# Patient Record
Sex: FEMALE | Race: WHITE | NOT HISPANIC OR LATINO | Employment: UNEMPLOYED | URBAN - METROPOLITAN AREA
[De-identification: names, ages, dates, MRNs, and addresses within clinical notes are randomized per-mention and may not be internally consistent; named-entity substitution may affect disease eponyms.]

---

## 2020-07-19 ENCOUNTER — HOSPITAL ENCOUNTER (OUTPATIENT)
Facility: HOSPITAL | Age: 35
Setting detail: OBSERVATION
End: 2020-07-20
Attending: EMERGENCY MEDICINE | Admitting: HOSPITALIST

## 2020-07-19 DIAGNOSIS — R45.851 SUICIDAL IDEATIONS: Primary | ICD-10-CM

## 2020-07-19 PROBLEM — F19.10 SUBSTANCE ABUSE: Status: ACTIVE | Noted: 2020-07-19

## 2020-07-19 PROBLEM — F41.1 GENERALIZED ANXIETY DISORDER: Status: ACTIVE | Noted: 2020-07-19

## 2020-07-19 LAB
ALBUMIN SERPL-MCNC: 4.1 G/DL (ref 3.5–5.2)
ALBUMIN/GLOB SERPL: 1.4 G/DL
ALP SERPL-CCNC: 78 U/L (ref 39–117)
ALT SERPL W P-5'-P-CCNC: 23 U/L (ref 1–33)
AMPHET+METHAMPHET UR QL: POSITIVE
ANION GAP SERPL CALCULATED.3IONS-SCNC: 18 MMOL/L (ref 5–15)
APAP SERPL-MCNC: <5 MCG/ML (ref 10–30)
AST SERPL-CCNC: 36 U/L (ref 1–32)
B PARAPERT DNA SPEC QL NAA+PROBE: NOT DETECTED
B PERT DNA SPEC QL NAA+PROBE: NOT DETECTED
BARBITURATES UR QL SCN: NEGATIVE
BASOPHILS # BLD AUTO: 0.1 10*3/MM3 (ref 0–0.2)
BASOPHILS NFR BLD AUTO: 0.7 % (ref 0–1.5)
BENZODIAZ UR QL SCN: NEGATIVE
BILIRUB SERPL-MCNC: 1.9 MG/DL (ref 0–1.2)
BUN SERPL-MCNC: 10 MG/DL (ref 6–20)
BUN SERPL-MCNC: ABNORMAL MG/DL
BUN/CREAT SERPL: ABNORMAL
C PNEUM DNA NPH QL NAA+NON-PROBE: NOT DETECTED
CALCIUM SPEC-SCNC: 9.5 MG/DL (ref 8.6–10.5)
CANNABINOIDS SERPL QL: NEGATIVE
CHLORIDE SERPL-SCNC: 98 MMOL/L (ref 98–107)
CK SERPL-CCNC: 326 U/L (ref 20–180)
CO2 SERPL-SCNC: 22 MMOL/L (ref 22–29)
COCAINE UR QL: NEGATIVE
CREAT SERPL-MCNC: 0.88 MG/DL (ref 0.57–1)
DEPRECATED RDW RBC AUTO: 44.6 FL (ref 37–54)
EOSINOPHIL # BLD AUTO: 0.2 10*3/MM3 (ref 0–0.4)
EOSINOPHIL NFR BLD AUTO: 3.3 % (ref 0.3–6.2)
ERYTHROCYTE [DISTWIDTH] IN BLOOD BY AUTOMATED COUNT: 13.6 % (ref 12.3–15.4)
ETHANOL UR QL: <0.01 %
FLUAV H1 2009 PAND RNA NPH QL NAA+PROBE: NOT DETECTED
FLUAV H1 HA GENE NPH QL NAA+PROBE: NOT DETECTED
FLUAV H3 RNA NPH QL NAA+PROBE: NOT DETECTED
FLUAV SUBTYP SPEC NAA+PROBE: NOT DETECTED
FLUBV RNA ISLT QL NAA+PROBE: NOT DETECTED
GFR SERPL CREATININE-BSD FRML MDRD: 73 ML/MIN/1.73
GLOBULIN UR ELPH-MCNC: 3 GM/DL
GLUCOSE SERPL-MCNC: 94 MG/DL (ref 65–99)
HADV DNA SPEC NAA+PROBE: NOT DETECTED
HCOV 229E RNA SPEC QL NAA+PROBE: NOT DETECTED
HCOV HKU1 RNA SPEC QL NAA+PROBE: NOT DETECTED
HCOV NL63 RNA SPEC QL NAA+PROBE: NOT DETECTED
HCOV OC43 RNA SPEC QL NAA+PROBE: NOT DETECTED
HCT VFR BLD AUTO: 41.4 % (ref 34–46.6)
HGB BLD-MCNC: 14.3 G/DL (ref 12–15.9)
HMPV RNA NPH QL NAA+NON-PROBE: NOT DETECTED
HPIV1 RNA SPEC QL NAA+PROBE: NOT DETECTED
HPIV2 RNA SPEC QL NAA+PROBE: NOT DETECTED
HPIV3 RNA NPH QL NAA+PROBE: NOT DETECTED
HPIV4 P GENE NPH QL NAA+PROBE: NOT DETECTED
LYMPHOCYTES # BLD AUTO: 2.2 10*3/MM3 (ref 0.7–3.1)
LYMPHOCYTES NFR BLD AUTO: 29.9 % (ref 19.6–45.3)
M PNEUMO IGG SER IA-ACNC: NOT DETECTED
MCH RBC QN AUTO: 32 PG (ref 26.6–33)
MCHC RBC AUTO-ENTMCNC: 34.5 G/DL (ref 31.5–35.7)
MCV RBC AUTO: 92.7 FL (ref 79–97)
METHADONE UR QL SCN: NEGATIVE
MONOCYTES # BLD AUTO: 0.8 10*3/MM3 (ref 0.1–0.9)
MONOCYTES NFR BLD AUTO: 10.4 % (ref 5–12)
NEUTROPHILS NFR BLD AUTO: 4.1 10*3/MM3 (ref 1.7–7)
NEUTROPHILS NFR BLD AUTO: 55.7 % (ref 42.7–76)
NRBC BLD AUTO-RTO: 0.1 /100 WBC (ref 0–0.2)
OPIATES UR QL: NEGATIVE
OXYCODONE UR QL SCN: NEGATIVE
PLATELET # BLD AUTO: 193 10*3/MM3 (ref 140–450)
PMV BLD AUTO: 6.9 FL (ref 6–12)
POTASSIUM SERPL-SCNC: 3.8 MMOL/L (ref 3.5–5.2)
PROT SERPL-MCNC: 7.1 G/DL (ref 6–8.5)
RBC # BLD AUTO: 4.47 10*6/MM3 (ref 3.77–5.28)
RHINOVIRUS RNA SPEC NAA+PROBE: NOT DETECTED
RSV RNA NPH QL NAA+NON-PROBE: NOT DETECTED
SALICYLATES SERPL-MCNC: 0.6 MG/DL
SARS-COV-2 RNA NPH QL NAA+NON-PROBE: NOT DETECTED
SODIUM SERPL-SCNC: 138 MMOL/L (ref 136–145)
WBC # BLD AUTO: 7.3 10*3/MM3 (ref 3.4–10.8)

## 2020-07-19 PROCEDURE — 80307 DRUG TEST PRSMV CHEM ANLYZR: CPT | Performed by: EMERGENCY MEDICINE

## 2020-07-19 PROCEDURE — G0378 HOSPITAL OBSERVATION PER HR: HCPCS

## 2020-07-19 PROCEDURE — 82550 ASSAY OF CK (CPK): CPT | Performed by: EMERGENCY MEDICINE

## 2020-07-19 PROCEDURE — 85025 COMPLETE CBC W/AUTO DIFF WBC: CPT | Performed by: EMERGENCY MEDICINE

## 2020-07-19 PROCEDURE — 99219 PR INITIAL OBSERVATION CARE/DAY 50 MINUTES: CPT | Performed by: INTERNAL MEDICINE

## 2020-07-19 PROCEDURE — 36415 COLL VENOUS BLD VENIPUNCTURE: CPT

## 2020-07-19 PROCEDURE — 0202U NFCT DS 22 TRGT SARS-COV-2: CPT | Performed by: EMERGENCY MEDICINE

## 2020-07-19 PROCEDURE — 99284 EMERGENCY DEPT VISIT MOD MDM: CPT

## 2020-07-19 PROCEDURE — 80053 COMPREHEN METABOLIC PANEL: CPT | Performed by: EMERGENCY MEDICINE

## 2020-07-19 RX ORDER — ALUMINA, MAGNESIA, AND SIMETHICONE 2400; 2400; 240 MG/30ML; MG/30ML; MG/30ML
15 SUSPENSION ORAL EVERY 6 HOURS PRN
Status: DISCONTINUED | OUTPATIENT
Start: 2020-07-19 | End: 2020-07-20 | Stop reason: HOSPADM

## 2020-07-19 RX ORDER — ACETAMINOPHEN 160 MG/5ML
650 SOLUTION ORAL EVERY 4 HOURS PRN
Status: DISCONTINUED | OUTPATIENT
Start: 2020-07-19 | End: 2020-07-20 | Stop reason: HOSPADM

## 2020-07-19 RX ORDER — ACETAMINOPHEN 650 MG/1
650 SUPPOSITORY RECTAL EVERY 4 HOURS PRN
Status: DISCONTINUED | OUTPATIENT
Start: 2020-07-19 | End: 2020-07-20 | Stop reason: HOSPADM

## 2020-07-19 RX ORDER — BISACODYL 10 MG
10 SUPPOSITORY, RECTAL RECTAL DAILY PRN
Status: DISCONTINUED | OUTPATIENT
Start: 2020-07-19 | End: 2020-07-20 | Stop reason: HOSPADM

## 2020-07-19 RX ORDER — SODIUM CHLORIDE 0.9 % (FLUSH) 0.9 %
10 SYRINGE (ML) INJECTION AS NEEDED
Status: DISCONTINUED | OUTPATIENT
Start: 2020-07-19 | End: 2020-07-20 | Stop reason: HOSPADM

## 2020-07-19 RX ORDER — SODIUM CHLORIDE 0.9 % (FLUSH) 0.9 %
10 SYRINGE (ML) INJECTION EVERY 12 HOURS SCHEDULED
Status: DISCONTINUED | OUTPATIENT
Start: 2020-07-19 | End: 2020-07-20 | Stop reason: HOSPADM

## 2020-07-19 RX ORDER — CHOLECALCIFEROL (VITAMIN D3) 125 MCG
5 CAPSULE ORAL NIGHTLY PRN
Status: DISCONTINUED | OUTPATIENT
Start: 2020-07-19 | End: 2020-07-20 | Stop reason: HOSPADM

## 2020-07-19 RX ORDER — BISACODYL 5 MG/1
5 TABLET, DELAYED RELEASE ORAL DAILY PRN
Status: DISCONTINUED | OUTPATIENT
Start: 2020-07-19 | End: 2020-07-20 | Stop reason: HOSPADM

## 2020-07-19 RX ORDER — ACETAMINOPHEN 500 MG
1000 TABLET ORAL ONCE
Status: COMPLETED | OUTPATIENT
Start: 2020-07-19 | End: 2020-07-19

## 2020-07-19 RX ORDER — GABAPENTIN 300 MG/1
300 CAPSULE ORAL 3 TIMES DAILY
Status: DISCONTINUED | OUTPATIENT
Start: 2020-07-19 | End: 2020-07-20 | Stop reason: HOSPADM

## 2020-07-19 RX ORDER — ACETAMINOPHEN 325 MG/1
650 TABLET ORAL EVERY 4 HOURS PRN
Status: DISCONTINUED | OUTPATIENT
Start: 2020-07-19 | End: 2020-07-20 | Stop reason: HOSPADM

## 2020-07-19 RX ORDER — BUPROPION HYDROCHLORIDE 150 MG/1
300 TABLET ORAL DAILY
Status: DISCONTINUED | OUTPATIENT
Start: 2020-07-19 | End: 2020-07-20 | Stop reason: HOSPADM

## 2020-07-19 RX ORDER — ONDANSETRON 4 MG/1
4 TABLET, FILM COATED ORAL EVERY 6 HOURS PRN
Status: DISCONTINUED | OUTPATIENT
Start: 2020-07-19 | End: 2020-07-20 | Stop reason: HOSPADM

## 2020-07-19 RX ORDER — NICOTINE 21 MG/24HR
1 PATCH, TRANSDERMAL 24 HOURS TRANSDERMAL
Status: DISCONTINUED | OUTPATIENT
Start: 2020-07-19 | End: 2020-07-20 | Stop reason: HOSPADM

## 2020-07-19 RX ORDER — HYDROXYZINE HYDROCHLORIDE 25 MG/1
25 TABLET, FILM COATED ORAL EVERY 6 HOURS PRN
Status: DISCONTINUED | OUTPATIENT
Start: 2020-07-19 | End: 2020-07-20 | Stop reason: HOSPADM

## 2020-07-19 RX ADMIN — GABAPENTIN 300 MG: 300 CAPSULE ORAL at 21:51

## 2020-07-19 RX ADMIN — NICOTINE 1 PATCH: 14 PATCH TRANSDERMAL at 15:16

## 2020-07-19 RX ADMIN — HYDROXYZINE HYDROCHLORIDE 25 MG: 25 TABLET, FILM COATED ORAL at 21:50

## 2020-07-19 RX ADMIN — MELATONIN TAB 5 MG 5 MG: 5 TAB at 21:50

## 2020-07-19 RX ADMIN — GABAPENTIN 300 MG: 300 CAPSULE ORAL at 15:16

## 2020-07-19 RX ADMIN — BUPROPION HYDROCHLORIDE 300 MG: 150 TABLET, EXTENDED RELEASE ORAL at 15:16

## 2020-07-19 RX ADMIN — ACETAMINOPHEN 1000 MG: 500 TABLET, FILM COATED ORAL at 09:08

## 2020-07-19 NOTE — H&P
[facility] HOSPITALIST     Provider, No Known    CHIEF COMPLAINT:     Suicidal ideation    HISTORY OF PRESENT ILLNESS:    35-year-old female past medical history of depression and substance abuse came to the ER complaining of suicidal ideation.  She had recently been in a rehab facility and they switched her antidepressants and that has not been working for her.  She recently relapsed with some methamphetamine abuse.  She is also been homeless for the past 4 days reported to the ER that she did want to hurt herself but has no current plans.  In the ER she did have evaluation by inpatient psych unit however they have no beds available but will have beds available tomorrow.  She was subsequently admitted to medicine with plans to discharge the patient there tomorrow.  Currently she is slightly anxious but cooperative and requesting to be started back on her previous antidepressants and antianxiety medications.  She has some cramping in her lower extremities.      History reviewed. No pertinent past medical history.  History reviewed. No pertinent surgical history.  History reviewed. No pertinent family history.  Social History     Tobacco Use   • Smoking status: Current Every Day Smoker     Packs/day: 0.50     Types: Cigarettes   • Smokeless tobacco: Never Used   Substance Use Topics   • Alcohol use: Not Currently   • Drug use: Yes     Frequency: 2.0 times per week     Types: IV, Methamphetamines     Comment: heroin, meth     No medications prior to admission.     Allergies:  Patient has no known allergies.      There is no immunization history on file for this patient.        REVIEW OF SYSTEMS:  Please see the above history of present illness for pertinent positives and negatives.  The remainder of the patient's systems have been reviewed and are negative.     Objective     Vital Signs  Temp:  [98.9 °F (37.2 °C)-99.7 °F (37.6 °C)] 98.9 °F (37.2 °C)  Heart Rate:  [] 76  Resp:  [16-24] 20  BP:  "()/() 133/116    Flowsheet Rows      First Filed Value   Admission Height  160 cm (63\") Documented at 07/19/2020 0810   Admission Weight  63.9 kg (140 lb 14 oz) Documented at 07/19/2020 0810           Physical Exam:  Physical Exam   Constitutional: She is oriented to person, place, and time. No distress.   HENT:   Head: Normocephalic.   Eyes: Pupils are equal, round, and reactive to light.   Neck: No JVD present.   Cardiovascular: Normal rate and regular rhythm.   Pulmonary/Chest: Effort normal. No respiratory distress.   Abdominal: Soft. She exhibits no distension.   Musculoskeletal: She exhibits no tenderness.   Neurological: She is alert and oriented to person, place, and time.   Skin: Skin is warm and dry.   Psychiatric: Thought content normal.   Slightly anxious         Results Review:    I reviewed the patient's new clinical results.  Lab Results (most recent)     Procedure Component Value Units Date/Time    Respiratory Panel PCR w/COVID-19(SARS-CoV-2) PERRY In-House, NP swab in [facility] [place] - Swab, Nasopharynx [[id number]] Collected:  07/19/20 1214    Specimen:  Swab from Nasopharynx Updated:  07/19/20 1215    Urine Drug Screen - Urine, Clean Catch [[id number]]  (Abnormal) Collected:  07/19/20 0850    Specimen:  Urine, Clean Catch Updated:  07/19/20 0923     Amphet/Methamphet, Screen Positive     Barbiturates Screen, Urine Negative     Benzodiazepine Screen, Urine Negative     Cocaine Screen, Urine Negative     Opiate Screen Negative     THC, Screen, Urine Negative     Methadone Screen, Urine Negative     Oxycodone Screen, Urine Negative    Narrative:       Negative Thresholds For Drugs Screened:     Amphetamines               500 ng/ml   Barbiturates               200 ng/ml   Benzodiazepines            100 ng/ml   Cocaine                    300 ng/ml   Methadone                  300 ng/ml   Opiates                    300 ng/ml   Oxycodone                  100 ng/ml   THC                        50 " ng/ml    The Normal Value for all drugs tested is negative. This report includes final unconfirmed screening results to be used for medical treatment purposes only. Unconfirmed results must not be used for non-medical purposes such as employment or legal testing. Clinical consideration should be applied to any drug of abuse test, particulary when unconfirmed results are used.  All urine drugs of abuse requests without chain of custody are for medical screening purposes only.  False positives are possible.      BUN [[id number]]  (Normal) Collected:  07/19/20 0839    Specimen:  Blood from Arm, Left Updated:  07/19/20 0920     BUN 10 mg/dL     Comprehensive Metabolic Panel [[id number]]  (Abnormal) Collected:  07/19/20 0839    Specimen:  Blood from Arm, Left Updated:  07/19/20 0917     Glucose 94 mg/dL      BUN --     Comment: Testing performed by alternate method        Creatinine 0.88 mg/dL      Sodium 138 mmol/L      Potassium 3.8 mmol/L      Comment: Specimen hemolyzed.  Results may be affected.        Chloride 98 mmol/L      CO2 22.0 mmol/L      Calcium 9.5 mg/dL      Total Protein 7.1 g/dL      Albumin 4.10 g/dL      ALT (SGPT) 23 U/L      AST (SGOT) 36 U/L      Alkaline Phosphatase 78 U/L      Total Bilirubin 1.9 mg/dL      eGFR Non African Amer 73 mL/min/1.73      Globulin 3.0 gm/dL      A/G Ratio 1.4 g/dL      BUN/Creatinine Ratio --     Comment: Testing not performed        Anion Gap 18.0 mmol/L     Narrative:       GFR Normal >60  Chronic Kidney Disease <60  Kidney Failure <15      CK [[id number]]  (Abnormal) Collected:  07/19/20 0839    Specimen:  Blood from Arm, Left Updated:  07/19/20 0917     Creatine Kinase 326 U/L     Acetaminophen Level [[id number]]  (Abnormal) Collected:  07/19/20 0839    Specimen:  Blood from Arm, Left Updated:  07/19/20 0917     Acetaminophen <5.0 mcg/mL     Narrative:       Acetaminophen Therapeutic Range  5-20 ug/mL      Hours after ingestion            Toxic Value    4 Hours                            150 ug/mL    8 Hours                            70 ug/mL   12 Hours                            40 ug/mL   16 Hours                            20 ug/mL    These values apply to a single ingestion only.     Salicylate Level [[id number]]  (Normal) Collected:  07/19/20 0839    Specimen:  Blood from Arm, Left Updated:  07/19/20 0917     Salicylate 0.6 mg/dL     Ethanol [[id number]] Collected:  07/19/20 0839    Specimen:  Blood from Arm, Left Updated:  07/19/20 0917     Ethanol % <0.010 %     Narrative:       Plasma Ethanol Clinical Symptoms:    ETOH (%)               Clinical Symptom  .01-.05              No apparent influence  .03-.12              Euphoria, Diminished judgment and attention   .09-.25              Impaired comprehension, Muscle incoordination  .18-.30              Confusion, Staggered gait, Slurred speech  .25-.40              Markedly decreased response to stimuli, unable to stand or                        walk, vomitting, sleep or stupor  .35-.50              Comatose, Anesthesia, Subnormal body temperature        CBC & Differential [[id number]] Collected:  07/19/20 0839    Specimen:  Blood from Arm, Left Updated:  07/19/20 0853    Narrative:       The following orders were created for panel order CBC & Differential.  Procedure                               Abnormality         Status                     ---------                               -----------         ------                     CBC Auto Differential[[id number]]        Normal              Final result                 Please view results for these tests on the individual orders.    CBC Auto Differential [[id number]]  (Normal) Collected:  07/19/20 0839    Specimen:  Blood from Arm, Left Updated:  07/19/20 0853     WBC 7.30 10*3/mm3      RBC 4.47 10*6/mm3      Hemoglobin 14.3 g/dL      Hematocrit 41.4 %      MCV 92.7 fL      MCH 32.0 pg      MCHC 34.5 g/dL      RDW 13.6 %      RDW-SD 44.6 fl      MPV 6.9 fL      Platelets 193  10*3/mm3      Neutrophil % 55.7 %      Lymphocyte % 29.9 %      Monocyte % 10.4 %      Eosinophil % 3.3 %      Basophil % 0.7 %      Neutrophils, Absolute 4.10 10*3/mm3      Lymphocytes, Absolute 2.20 10*3/mm3      Monocytes, Absolute 0.80 10*3/mm3      Eosinophils, Absolute 0.20 10*3/mm3      Basophils, Absolute 0.10 10*3/mm3      nRBC 0.1 /100 WBC           Imaging Results (Most Recent)     None            ECG/EMG Results (most recent)     None              Assessment/Plan     Active Hospital Problems:  Active Hospital Problems    Diagnosis  POA   • **Suicidal ideations [R45.851]  Not Applicable   • Generalized anxiety disorder [F41.1]  Yes   • Substance abuse (CMS/HCC) [F19.10]  Yes      Resolved Hospital Problems   No resolved problems to display.       Suicidal ideations  -Plan for transfer to inpatient psychiatric unit at [facility] tomorrow, evaluation done already and patient agreeable   -24-hour hold until then  -Suicide precautions    Generalized anxiety disorder  -Restart BuSpar  -PRN hydroxyzine    Chronic pain  -Restart gabapentin 3 times a day    DVT ppx-SCDs    This patient has been examined wearing appropriate Personal Protective Equipment . 07/19/20      I discussed the patient's findings and my recommendations with patient.     Tony Mercer DO  07/19/20  13:01

## 2020-07-19 NOTE — ED NOTES
I called [facility] for assessment on this patient.  They told me to call back when we have an ethanol level and hey will start process of mobile assessment.     Kristan Horton, RegSched Rep  07/19/20 0806

## 2020-07-19 NOTE — PLAN OF CARE
Patient her for SI. [facility] psych has accepted, will not have a bed until tomorrow at the earliest. Patient has sitter at bedside. She is pleasant and cooperative with staff.

## 2020-07-19 NOTE — ED NOTES
Face sheet and labs faxed to [facility].  I called Juli and reported a negative ethanol level.  She will begin arranging the mobile assessment with the clinician.     Kristan Horton, RegKenricked Rep  07/19/20 6181

## 2020-07-19 NOTE — PROGRESS NOTES
Continued Stay Note  BRIAN Dudley     Patient Name: Diamond Llamas  MRN: [id number]  Today's Date: 7/19/2020    Admit Date: 7/19/2020    Discharge Plan     Row Name 07/19/20 1329       Plan    Plan  [facility] bed Monday for SI    Plan Comments  Juli at [facility] stated a bed is available Monday. Pt will need a negative Covid19 test result and Consent to Treat faxed to [phone].  Juli stated she will fax the Consent to Treat form to Darcy at [phone].  Prabha is aware. Pt is agreeable. Pt was put on a 24 hr hold at 12:40pm 7/19 which expires 7/20 12:40pm.        Margaret PIERSONW, LSW  Weekend   Care Management Dept  Cell [phone]  Weekday Department [phone]

## 2020-07-19 NOTE — ED PROVIDER NOTES
Subjective   Chief complaint suicidal ideations    History of present illness 35-year-old female with a history of depression history of substance abuse who states that she is currently suicidal.  The patient states that she had been recently in  rehab facility and switched from BuSpar to Celexa.  She also states that she has been off Suboxone for 3 weeks and relapsed on some meth recently.  She states she has been homeless for the last 4 days and she wants to kill herself although she has no plan currently.  No alcohol use no injury.  Symptoms are severe continuous for days.          Review of Systems   Constitutional: Negative for chills and fever.   Respiratory: Negative for chest tightness and shortness of breath.    Gastrointestinal: Negative for abdominal pain and vomiting.   Genitourinary: Negative for difficulty urinating and dysuria.   Skin: Negative for color change and pallor.   Psychiatric/Behavioral: Positive for suicidal ideas. Negative for agitation.       History reviewed. No pertinent past medical history.    No Known Allergies    History reviewed. No pertinent surgical history.    History reviewed. No pertinent family history.    Social History     Socioeconomic History   • Marital status: Single     Spouse name: Not on file   • Number of children: Not on file   • Years of education: Not on file   • Highest education level: Not on file   Tobacco Use   • Smoking status: Current Every Day Smoker     Packs/day: 0.50     Types: Cigarettes   • Smokeless tobacco: Never Used   Substance and Sexual Activity   • Alcohol use: Not Currently   • Drug use: Yes     Frequency: 2.0 times per week     Types: IV, Methamphetamines     Comment: heroin, meth   • Sexual activity: Defer           Objective   Physical Exam  35-year-old awake alert very tearful crying HEENT extraocular muscles intact pupils equal and reactive neck supple no adenopathy no meningeal signs lungs clear no retractions heart regular without  murmur abdomen soft no tenderness or mass extremities full range of motion no deformities no palp cords or Homans sign no cellulitic changes.  Patient's awake alert very tearful crying no facial asymmetry normal speech without focal weakness  Procedures           ED Course      Results for orders placed or performed during the hospital encounter of 07/19/20   Comprehensive Metabolic Panel   Result Value Ref Range    Glucose 94 65 - 99 mg/dL    BUN      Creatinine 0.88 0.57 - 1.00 mg/dL    Sodium 138 136 - 145 mmol/L    Potassium 3.8 3.5 - 5.2 mmol/L    Chloride 98 98 - 107 mmol/L    CO2 22.0 22.0 - 29.0 mmol/L    Calcium 9.5 8.6 - 10.5 mg/dL    Total Protein 7.1 6.0 - 8.5 g/dL    Albumin 4.10 3.50 - 5.20 g/dL    ALT (SGPT) 23 1 - 33 U/L    AST (SGOT) 36 (H) 1 - 32 U/L    Alkaline Phosphatase 78 39 - 117 U/L    Total Bilirubin 1.9 (H) 0.0 - 1.2 mg/dL    eGFR Non African Amer 73 >60 mL/min/1.73    Globulin 3.0 gm/dL    A/G Ratio 1.4 g/dL    BUN/Creatinine Ratio      Anion Gap 18.0 (H) 5.0 - 15.0 mmol/L   CK   Result Value Ref Range    Creatine Kinase 326 (H) 20 - 180 U/L   Acetaminophen Level   Result Value Ref Range    Acetaminophen <5.0 (L) 10.0 - 30.0 mcg/mL   Salicylate Level   Result Value Ref Range    Salicylate 0.6 <=30.0 mg/dL   Ethanol   Result Value Ref Range    Ethanol % <0.010 %   Urine Drug Screen - Urine, Clean Catch   Result Value Ref Range    Amphet/Methamphet, Screen Positive (A) Negative    Barbiturates Screen, Urine Negative Negative    Benzodiazepine Screen, Urine Negative Negative    Cocaine Screen, Urine Negative Negative    Opiate Screen Negative Negative    THC, Screen, Urine Negative Negative    Methadone Screen, Urine Negative Negative    Oxycodone Screen, Urine Negative Negative   CBC Auto Differential   Result Value Ref Range    WBC 7.30 3.40 - 10.80 10*3/mm3    RBC 4.47 3.77 - 5.28 10*6/mm3    Hemoglobin 14.3 12.0 - 15.9 g/dL    Hematocrit 41.4 34.0 - 46.6 %    MCV 92.7 79.0 - 97.0 fL     MCH 32.0 26.6 - 33.0 pg    MCHC 34.5 31.5 - 35.7 g/dL    RDW 13.6 12.3 - 15.4 %    RDW-SD 44.6 37.0 - 54.0 fl    MPV 6.9 6.0 - 12.0 fL    Platelets 193 140 - 450 10*3/mm3    Neutrophil % 55.7 42.7 - 76.0 %    Lymphocyte % 29.9 19.6 - 45.3 %    Monocyte % 10.4 5.0 - 12.0 %    Eosinophil % 3.3 0.3 - 6.2 %    Basophil % 0.7 0.0 - 1.5 %    Neutrophils, Absolute 4.10 1.70 - 7.00 10*3/mm3    Lymphocytes, Absolute 2.20 0.70 - 3.10 10*3/mm3    Monocytes, Absolute 0.80 0.10 - 0.90 10*3/mm3    Eosinophils, Absolute 0.20 0.00 - 0.40 10*3/mm3    Basophils, Absolute 0.10 0.00 - 0.20 10*3/mm3    nRBC 0.1 0.0 - 0.2 /100 WBC   BUN   Result Value Ref Range    BUN 10 6 - 20 mg/dL     No radiology results for the last day  Medications   acetaminophen (TYLENOL) tablet 1,000 mg (1,000 mg Oral Given 7/19/20 0908)                                            MDM  Number of Diagnoses or Management Options  Suicidal ideations:   Diagnosis management comments: Medical decision making.  Patient had the above exam evaluation laboratory evaluation unremarkable drug screen showed amphetamines patient remained suicidal she was seen by [facility].  They would agree that she needs admitted for emergent psychiatric evaluation but they have no beds.  They will have a bed tomorrow if we can keep her here until then they can get her transferred tomorrow.  Patient agreeable to treatment.  She remained stable.  Hospitalist paged and the patient be placed in hospital for further care and psychiatric evaluation and transfer to [facility] tomorrow.      Final diagnoses:   Suicidal ideations            Chema Finch MD  07/19/20 1037

## 2020-07-19 NOTE — DISCHARGE PLACEMENT REQUEST
"Alicja Llamas (35 y.o. Female)     Date of Birth Social Security Number Address Home Phone MRN    1985  [id number] [address]  [id number]    [employer] Mar[employer] Status          None Single       Admission Date Admission Type Admitting Provider Attending Provider Department, Room/Bed    7/19/20 Emergency Tony Mercer, Tony Freeman,  [facility] 3A MEDICAL INPATIENT, 306/1    Discharge Date Discharge Disposition Discharge Destination                       Attending Provider:  Tony Mercer DO    Allergies:  No Known Allergies    Isolation:  None   Infection:  COVID (rule out) (07/19/20)   Code Status:  CPR    Ht:  160 cm (63\")   Wt:  63.9 kg (140 lb 14 oz)    Admission Cmt:  None   Principal Problem:  Suicidal ideations [R45.851]                 Active Insurance as of 7/19/2020     Primary Coverage     Payor Plan Insurance Group Employer/Plan Group    [employer] [id number]     Payor Plan Address Payor Plan Phone Number Payor Plan Fax Number Effective Dates    MAIL STOP:   4/8/2020 - None Entered    [address] [address]       [facility] [id number]       Subscriber Name Subscriber Birth Date Member ID       ALICJA LLAMAS 1985 [id number]                 Emergency Contacts      (Rel.) Home Phone Work Phone Mobile Phone    CONTACT,NO -- -- [phone]            "
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
## 2020-07-20 VITALS
SYSTOLIC BLOOD PRESSURE: 108 MMHG | TEMPERATURE: 99.6 F | HEART RATE: 92 BPM | WEIGHT: 140.87 LBS | BODY MASS INDEX: 24.96 KG/M2 | HEIGHT: 63 IN | DIASTOLIC BLOOD PRESSURE: 66 MMHG | OXYGEN SATURATION: 97 % | RESPIRATION RATE: 17 BRPM

## 2020-07-20 PROCEDURE — G0378 HOSPITAL OBSERVATION PER HR: HCPCS

## 2020-07-20 PROCEDURE — 99217 PR OBSERVATION CARE DISCHARGE MANAGEMENT: CPT | Performed by: HOSPITALIST

## 2020-07-20 RX ORDER — BUPROPION HYDROCHLORIDE 300 MG/1
300 TABLET ORAL DAILY
Qty: 10 TABLET | Refills: 0 | Status: SHIPPED | OUTPATIENT
Start: 2020-07-20 | End: 2022-05-04 | Stop reason: SDUPTHER

## 2020-07-20 RX ORDER — NICOTINE 21 MG/24HR
1 PATCH, TRANSDERMAL 24 HOURS TRANSDERMAL
Qty: 1 PATCH | Refills: 0 | Status: SHIPPED | OUTPATIENT
Start: 2020-07-20 | End: 2022-05-04

## 2020-07-20 RX ORDER — HYDROXYZINE HYDROCHLORIDE 25 MG/1
25 TABLET, FILM COATED ORAL EVERY 6 HOURS PRN
Qty: 10 TABLET | Refills: 0 | Status: SHIPPED | OUTPATIENT
Start: 2020-07-20

## 2020-07-20 RX ORDER — GABAPENTIN 300 MG/1
300 CAPSULE ORAL 3 TIMES DAILY
Qty: 10 CAPSULE | Refills: 0 | Status: SHIPPED | OUTPATIENT
Start: 2020-07-20

## 2020-07-20 RX ADMIN — GABAPENTIN 300 MG: 300 CAPSULE ORAL at 09:27

## 2020-07-20 RX ADMIN — HYDROXYZINE HYDROCHLORIDE 25 MG: 25 TABLET, FILM COATED ORAL at 03:36

## 2020-07-20 RX ADMIN — BUPROPION HYDROCHLORIDE 300 MG: 150 TABLET, EXTENDED RELEASE ORAL at 09:27

## 2020-07-20 NOTE — PLAN OF CARE
"  Problem: Patient Care Overview  Goal: Plan of Care Review  Outcome: Ongoing (interventions implemented as appropriate)  Note:   Patient rested well through the night with a sitter at bedside. Concerned about \"infection\" in right arm (track marks). Patient is anticipating discharge to [facility] today. Will continue to monitor.     "

## 2020-07-20 NOTE — NURSING NOTE
Pt left for [facility] via EMS with no c/o pain or discomfort. Report given to Juli at [facility]. Security was contacted regarding pt's belongings and they stated that they would meet EMS by the ER entrance to return patient's belongings.

## 2020-07-20 NOTE — PROGRESS NOTES
Case Management Discharge Note      Final Note: [facility]          Destination - Selection Complete      Service Provider Request Status Selected Services Address Phone Number Fax Number    [facility] [address] [facility] [address] [phone]             Final Discharge Disposition Code: 65 - psychiatric hospital or unit

## 2020-07-20 NOTE — DISCHARGE SUMMARY
Date of Admission: 7/19/2020    Date of Discharge:  7/20/2020    Length of stay:  LOS: 0 days     Chief complaint suicidal ideation  Hospital Course    35-year-old female past medical history of depression and substance abuse came to the ER complaining of suicidal ideation.  She had recently been in a rehab facility and they switched her antidepressants and that has not been working for her.  She recently relapsed with some methamphetamine abuse.  She is also been homeless for the past 4 days reported to the ER that she did want to hurt herself but has no current plans.  In the ER she did have evaluation by inpatient psych unit however they have no beds available but will have beds available tomorrow.  She was subsequently admitted to medicine with plans to discharge the patient there tomorrow.  Currently she is slightly anxious but cooperative and requesting to be started back on her previous antidepressants and antianxiety medications.  She has some cramping in her lower extremities.    Physical Exam   Constitutional: She is oriented to person, place, and time. No distress.   HENT:   Head: Normocephalic and atraumatic.   Eyes: Conjunctivae and EOM are normal. Pupils are equal, round, and reactive to light.   Neck: No JVD present. No thyromegaly present.   Cardiovascular: Normal rate, regular rhythm, normal heart sounds and intact distal pulses. Exam reveals no gallop and no friction rub.   No murmur heard.  Pulmonary/Chest: Effort normal and breath sounds normal. No stridor. No respiratory distress. He has no wheezes.  She has no rales. He exhibits no tenderness.   Abdominal: Soft. Bowel sounds are normal. He exhibits no distension and no mass. There is no tenderness. There is no rebound and no guarding. No hernia.   Musculoskeletal: Normal range of motion.   Lymphadenopathy:     He has no cervical adenopathy.   Neurological: She is alert and oriented to person, place, and time. No cranial nerve deficit or  sensory deficit. He exhibits normal muscle tone.   Skin: No rash noted. He is not diaphoretic.   Psychiatric: She has a normal mood and affect.   Vitals reviewed.       Hospital course and problem list    Suicidal ideation  Continue current regimen patient on Wellbutrin, gabapentin, as needed hydroxyzine  Transfer to psychiatric institution for further care when bed available    Anxiety  As mentioned above on medications    Chronic pain  Continue gabapentin    DVT PUD prophylaxis    Transfer to psychiatric facility when arranged for          Pertinent Test Results:     Lab Results (last 48 hours)     Procedure Component Value Units Date/Time    Respiratory Panel PCR w/COVID-19(SARS-CoV-2) [facility] In-House, NP swab in UTM/VTM - Swab, Nasopharynx [[id number]]  (Normal) Collected:  07/19/20 1214    Specimen:  Swab from Nasopharynx Updated:  07/19/20 1837     ADENOVIRUS, PCR Not Detected     Coronavirus 229E Not Detected     Coronavirus HKU1 Not Detected     Coronavirus NL63 Not Detected     Coronavirus OC43 Not Detected     COVID19 Not Detected     Human Metapneumovirus Not Detected     Human Rhinovirus/Enterovirus Not Detected     Influenza A PCR Not Detected     Influenza A H1 Not Detected     Influenza A H1 2009 PCR Not Detected     Influenza A H3 Not Detected     Influenza B PCR Not Detected     Parainfluenza Virus 1 Not Detected     Parainfluenza Virus 2 Not Detected     Parainfluenza Virus 3 Not Detected     Parainfluenza Virus 4 Not Detected     RSV, PCR Not Detected     Bordetella pertussis pcr Not Detected     Bordetella parapertussis PCR Not Detected     Chlamydophila pneumoniae PCR Not Detected     Mycoplasma pneumo by PCR Not Detected    Narrative:       Fact sheet for providers: https://docs.[employer]/wp-content/uploads/CYJ2937-3786-BT0.1-EUA-Provider-Fact-Sheet-3.pdf    Fact sheet for patients: https://docs.[employer]/wp-content/uploads/QBZ4961-2663-JT4.1-EUA-Patient-Fact-Sheet-1.pdf    Urine Drug  Screen - Urine, Clean Catch [[id number]]  (Abnormal) Collected:  07/19/20 0850    Specimen:  Urine, Clean Catch Updated:  07/19/20 0923     Amphet/Methamphet, Screen Positive     Barbiturates Screen, Urine Negative     Benzodiazepine Screen, Urine Negative     Cocaine Screen, Urine Negative     Opiate Screen Negative     THC, Screen, Urine Negative     Methadone Screen, Urine Negative     Oxycodone Screen, Urine Negative    Narrative:       Negative Thresholds For Drugs Screened:     Amphetamines               500 ng/ml   Barbiturates               200 ng/ml   Benzodiazepines            100 ng/ml   Cocaine                    300 ng/ml   Methadone                  300 ng/ml   Opiates                    300 ng/ml   Oxycodone                  100 ng/ml   THC                        50 ng/ml    The Normal Value for all drugs tested is negative. This report includes final unconfirmed screening results to be used for medical treatment purposes only. Unconfirmed results must not be used for non-medical purposes such as employment or legal testing. Clinical consideration should be applied to any drug of abuse test, particulary when unconfirmed results are used.  All urine drugs of abuse requests without chain of custody are for medical screening purposes only.  False positives are possible.      BUN [[id number]]  (Normal) Collected:  07/19/20 0839    Specimen:  Blood from Arm, Left Updated:  07/19/20 0920     BUN 10 mg/dL     Comprehensive Metabolic Panel [[id number]]  (Abnormal) Collected:  07/19/20 0839    Specimen:  Blood from Arm, Left Updated:  07/19/20 0917     Glucose 94 mg/dL      BUN --     Comment: Testing performed by alternate method        Creatinine 0.88 mg/dL      Sodium 138 mmol/L      Potassium 3.8 mmol/L      Comment: Specimen hemolyzed.  Results may be affected.        Chloride 98 mmol/L      CO2 22.0 mmol/L      Calcium 9.5 mg/dL      Total Protein 7.1 g/dL      Albumin 4.10 g/dL      ALT (SGPT) 23 U/L       AST (SGOT) 36 U/L      Alkaline Phosphatase 78 U/L      Total Bilirubin 1.9 mg/dL      eGFR Non African Amer 73 mL/min/1.73      Globulin 3.0 gm/dL      A/G Ratio 1.4 g/dL      BUN/Creatinine Ratio --     Comment: Testing not performed        Anion Gap 18.0 mmol/L     Narrative:       GFR Normal >60  Chronic Kidney Disease <60  Kidney Failure <15      CK [[id number]]  (Abnormal) Collected:  07/19/20 0839    Specimen:  Blood from Arm, Left Updated:  07/19/20 0917     Creatine Kinase 326 U/L     Acetaminophen Level [[id number]]  (Abnormal) Collected:  07/19/20 0839    Specimen:  Blood from Arm, Left Updated:  07/19/20 0917     Acetaminophen <5.0 mcg/mL     Narrative:       Acetaminophen Therapeutic Range  5-20 ug/mL      Hours after ingestion            Toxic Value    4 Hours                           150 ug/mL    8 Hours                            70 ug/mL   12 Hours                            40 ug/mL   16 Hours                            20 ug/mL    These values apply to a single ingestion only.     Salicylate Level [[id number]]  (Normal) Collected:  07/19/20 0839    Specimen:  Blood from Arm, Left Updated:  07/19/20 0917     Salicylate 0.6 mg/dL     Ethanol [[id number]] Collected:  07/19/20 0839    Specimen:  Blood from Arm, Left Updated:  07/19/20 0917     Ethanol % <0.010 %     Narrative:       Plasma Ethanol Clinical Symptoms:    ETOH (%)               Clinical Symptom  .01-.05              No apparent influence  .03-.12              Euphoria, Diminished judgment and attention   .09-.25              Impaired comprehension, Muscle incoordination  .18-.30              Confusion, Staggered gait, Slurred speech  .25-.40              Markedly decreased response to stimuli, unable to stand or                        walk, vomitting, sleep or stupor  .35-.50              Comatose, Anesthesia, Subnormal body temperature        CBC & Differential [[id number]] Collected:  07/19/20 0839    Specimen:  Blood from Arm, Left  "Updated:  07/19/20 0853    Narrative:       The following orders were created for panel order CBC & Differential.  Procedure                               Abnormality         Status                     ---------                               -----------         ------                     CBC Auto Differential[[id number]]        Normal              Final result                 Please view results for these tests on the individual orders.    CBC Auto Differential [[id number]]  (Normal) Collected:  07/19/20 0839    Specimen:  Blood from Arm, Left Updated:  07/19/20 0853     WBC 7.30 10*3/mm3      RBC 4.47 10*6/mm3      Hemoglobin 14.3 g/dL      Hematocrit 41.4 %      MCV 92.7 fL      MCH 32.0 pg      MCHC 34.5 g/dL      RDW 13.6 %      RDW-SD 44.6 fl      MPV 6.9 fL      Platelets 193 10*3/mm3      Neutrophil % 55.7 %      Lymphocyte % 29.9 %      Monocyte % 10.4 %      Eosinophil % 3.3 %      Basophil % 0.7 %      Neutrophils, Absolute 4.10 10*3/mm3      Lymphocytes, Absolute 2.20 10*3/mm3      Monocytes, Absolute 0.80 10*3/mm3      Eosinophils, Absolute 0.20 10*3/mm3      Basophils, Absolute 0.10 10*3/mm3      nRBC 0.1 /100 WBC                  Imaging Results (All)     None            Vital Signs  Visit Vitals  /66 (BP Location: Left arm, Patient Position: Lying)   Pulse 92   Temp 99.6 °F (37.6 °C) (Oral)   Resp 17   Ht 160 cm (63\")   Wt 63.9 kg (140 lb 14 oz)   LMP 06/12/2020   SpO2 97%   BMI 24.95 kg/m²       Physical Exam:  Physical Exam      Discharge Medications     Discharge Medications      New Medications      Instructions Start Date   buPROPion  MG 24 hr tablet  Commonly known as:  WELLBUTRIN XL   300 mg, Oral, Daily      gabapentin 300 MG capsule  Commonly known as:  NEURONTIN   300 mg, Oral, 3 Times Daily      hydrOXYzine 25 MG tablet  Commonly known as:  ATARAX   25 mg, Oral, Every 6 Hours PRN      nicotine 14 MG/24HR patch  Commonly known as:  NICODERM CQ   1 patch, Transdermal, Every 24 Hours " Scheduled             Discharge Diet:   Diet Instructions     Diet: Regular      Discharge Diet:  Regular          Activity at Discharge:   Activity Instructions     Activity as Tolerated            Follow-up Appointments  No future appointments.          Libia Anthony MD  07/20/20  09:17    Time: Discharge 38 min

## 2020-07-20 NOTE — PROGRESS NOTES
Continued Stay Note   Luis Enrique     Patient Name: Diamond Llamas  MRN: [id number]  Today's Date: 7/20/2020    Admit Date: 7/19/2020    Discharge Plan     Row Name 07/20/20 0903       Plan    Plan  [facility] at d/c. Bed available 7/20. Okay to transfer.     Plan Comments  SW contacted [facility] this AM (spoke w/ Alvaro) who confirmed that pt has a bed and can transfer today. SW spoke w/ nursing unit, who confirmed that they have called report and set up transport.         Socorro Johnson, KENNAW, LSW  PRN   Phone: [phone]

## 2022-05-04 ENCOUNTER — LAB (OUTPATIENT)
Dept: LAB | Facility: HOSPITAL | Age: 37
End: 2022-05-04

## 2022-05-04 ENCOUNTER — OFFICE VISIT (OUTPATIENT)
Dept: INTERNAL MEDICINE | Facility: CLINIC | Age: 37
End: 2022-05-04

## 2022-05-04 VITALS
SYSTOLIC BLOOD PRESSURE: 104 MMHG | HEART RATE: 79 BPM | DIASTOLIC BLOOD PRESSURE: 62 MMHG | WEIGHT: 136 LBS | OXYGEN SATURATION: 98 % | RESPIRATION RATE: 16 BRPM | BODY MASS INDEX: 25.03 KG/M2 | TEMPERATURE: 98.3 F | HEIGHT: 62 IN

## 2022-05-04 DIAGNOSIS — G25.81 RLS (RESTLESS LEGS SYNDROME): ICD-10-CM

## 2022-05-04 DIAGNOSIS — G62.9 PERIPHERAL POLYNEUROPATHY: ICD-10-CM

## 2022-05-04 DIAGNOSIS — Z00.00 HEALTH CARE MAINTENANCE: ICD-10-CM

## 2022-05-04 DIAGNOSIS — F32.A DEPRESSION, UNSPECIFIED DEPRESSION TYPE: Primary | ICD-10-CM

## 2022-05-04 DIAGNOSIS — Z23 NEED FOR DIPHTHERIA-TETANUS-PERTUSSIS (TDAP) VACCINE: ICD-10-CM

## 2022-05-04 DIAGNOSIS — Z12.4 ENCOUNTER FOR PAPANICOLAOU SMEAR OF CERVIX: ICD-10-CM

## 2022-05-04 PROCEDURE — 90715 TDAP VACCINE 7 YRS/> IM: CPT | Performed by: PHYSICIAN ASSISTANT

## 2022-05-04 PROCEDURE — 99204 OFFICE O/P NEW MOD 45 MIN: CPT | Performed by: PHYSICIAN ASSISTANT

## 2022-05-04 PROCEDURE — 90471 IMMUNIZATION ADMIN: CPT | Performed by: PHYSICIAN ASSISTANT

## 2022-05-04 RX ORDER — ROPINIROLE 0.25 MG/1
0.25 TABLET, FILM COATED ORAL NIGHTLY
COMMUNITY

## 2022-05-04 RX ORDER — BUPRENORPHINE AND NALOXONE 8; 2 MG/1; MG/1
FILM, SOLUBLE BUCCAL; SUBLINGUAL
COMMUNITY
Start: 2022-05-02

## 2022-05-04 RX ORDER — TRAZODONE HYDROCHLORIDE 100 MG/1
100 TABLET ORAL NIGHTLY
COMMUNITY

## 2022-05-04 RX ORDER — BUPROPION HYDROCHLORIDE 300 MG/1
300 TABLET ORAL DAILY
Qty: 10 TABLET | Refills: 0 | Status: SHIPPED | OUTPATIENT
Start: 2022-05-04 | End: 2022-05-06 | Stop reason: SDUPTHER

## 2022-05-04 NOTE — PROGRESS NOTES
[facility]  [address]  [place], located within [place] [address]  Dept: [phone]  Dept Fax: [phone]  Loc: [phone]  Loc Fax: [phone]    Diamond Llamas  1985    New Patient Office Note    History of Present Illness:  Patient is a 36-year-old female in today to establish care for depression, peripheral neuropathy, and restless leg syndrome.  Patient has had depression for years.  Was hospitalized for suicide attempt approximately 1 year ago.  Denies any more SI or HI.  Has a  that works with her as well as her  at the recovery home that she is living at.  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  Celebrating 2 months of sobriety now.  This is following a relapse where she was sober for 5 years before.  Patient on Wellbutrin 300 mg daily.  Needing refill of this medicine.  Patient taking medication as directed without any problems or side effects reports excellent symptomatic control of her depression.    Patient continues to struggle with pain in her legs that she describes as sharp at times as well as having some numbness and tingling.  Has been diagnosed with restless leg syndrome on the propranolol nightly.  Patient take medication as directed but pain is worsening.  Patient also has been diagnosed with peripheral neuropathy.  Patient needing referral to neurology for further evaluation at this point.    Patient needing referral to OB/GYN for Pap smear mammogram.      Patient needing Tdap.    Patient will be getting COVID vaccine.    Patient otherwise overall doing well and feeling well.      The following portions of the patient's history were reviewed and updated as appropriate: allergies, current medications, past family history, past medical history, past social history, past surgical history, and problem list.    Medications:    Current Outpatient Medications:   •  buprenorphine-naloxone  (SUBOXONE) 8-2 MG film film, , Disp: , Rfl:   •  buPROPion XL (WELLBUTRIN XL) 300 MG 24 hr tablet, Take 1 tablet by mouth Daily., Disp: 10 tablet, Rfl: 0  •  hydrOXYzine (ATARAX) 25 MG tablet, Take 1 tablet by mouth Every 6 (Six) Hours As Needed for Anxiety., Disp: 10 tablet, Rfl: 0  •  rOPINIRole (REQUIP) 0.25 MG tablet, Take 0.25 mg by mouth Every Night. Take 1 hour before bedtime., Disp: , Rfl:   •  traZODone (DESYREL) 100 MG tablet, Take 100 mg by mouth Every Night., Disp: , Rfl:   •  gabapentin (NEURONTIN) 300 MG capsule, Take 1 capsule by mouth 3 (Three) Times a Day., Disp: 10 capsule, Rfl: 0    Subjective  No Known Allergies     Past Medical History:   Diagnosis Date   • Acid reflux    • Depression    • Fibromyalgia, primary    • Headache    • Infectious viral hepatitis    • Ovarian cyst        Past Surgical History:   Procedure Laterality Date   • OVARIAN CYST REMOVAL  2003   • TUBAL ABDOMINAL LIGATION  2017       Family History   Problem Relation Age of Onset   • Mental illness Mother    • Cancer Mother    • Migraine headaches Mother    • Liver disease Father    • Cancer Maternal Grandfather    • Cancer Paternal Grandfather         Social History     Socioeconomic History   • Marital status: Single   Tobacco Use   • Smoking status: Current Every Day Smoker     Packs/day: 0.50     Types: Cigarettes   • Smokeless tobacco: Never Used   Substance and Sexual Activity   • Alcohol use: Not Currently   • Drug use: Yes     Frequency: 2.0 times per week     Types: IV, Methamphetamines     Comment: heroin, meth   • Sexual activity: Defer       Review of Systems   Constitutional: Negative for activity change, chills, fatigue, fever and unexpected weight change.   HENT: Negative for congestion, ear pain, postnasal drip, sinus pressure and sore throat.    Eyes: Negative for pain, discharge and redness.   Respiratory: Negative for cough, shortness of breath and wheezing.    Cardiovascular: Negative for chest pain,  "palpitations and leg swelling.   Gastrointestinal: Negative for diarrhea, nausea and vomiting.   Endocrine: Negative for cold intolerance and heat intolerance.   Genitourinary: Negative for decreased urine volume and dysuria.   Musculoskeletal: Negative for arthralgias and myalgias.   Skin: Negative for rash and wound.   Neurological: Negative for dizziness, light-headedness and headaches.   Hematological: Does not bruise/bleed easily.   Psychiatric/Behavioral: Negative for confusion, dysphoric mood and sleep disturbance. The patient is not nervous/anxious.        Objective  Vitals:    05/04/22 1114   BP: 104/62   Pulse: 79   Resp: 16   Temp: 98.3 °F (36.8 °C)   TempSrc: Temporal   SpO2: 98%   Weight: 61.7 kg (136 lb)   Height: 157.5 cm (62\")       Physical Exam  Physical Exam  Vitals and nursing note reviewed.   Constitutional:       General: She is not in acute distress.     Appearance: She is not ill-appearing.   HENT:      Head: Normocephalic.      Right Ear: Tympanic membrane, ear canal and external ear normal. There is no impacted cerumen.      Left Ear: Tympanic membrane, ear canal and external ear normal. There is no impacted cerumen.      Nose: No congestion or rhinorrhea.      Mouth/Throat:      Mouth: Mucous membranes are moist.      Pharynx: Oropharynx is clear. No oropharyngeal exudate or posterior oropharyngeal erythema.   Eyes:      General:         Right eye: No discharge.         Left eye: No discharge.      Extraocular Movements: Extraocular movements intact.      Conjunctiva/sclera: Conjunctivae normal.      Pupils: Pupils are equal, round, and reactive to light.   Cardiovascular:      Rate and Rhythm: Normal rate and regular rhythm.      Heart sounds: Normal heart sounds. No murmur heard.    No friction rub. No gallop.   Pulmonary:      Effort: Pulmonary effort is normal. No respiratory distress.      Breath sounds: Normal breath sounds. No wheezing.   Abdominal:      General: Bowel sounds are " normal. There is no distension.      Palpations: Abdomen is soft. There is no mass.      Tenderness: There is no abdominal tenderness.   Musculoskeletal:         General: No swelling. Normal range of motion.      Cervical back: Normal range of motion. No tenderness.      Right lower leg: No edema.      Left lower leg: No edema.   Lymphadenopathy:      Cervical: No cervical adenopathy.   Skin:     Findings: No bruising, erythema or rash.   Neurological:      Mental Status: She is oriented to person, place, and time.      Gait: Gait normal.   Psychiatric:         Mood and Affect: Mood normal.         Behavior: Behavior normal.         Thought Content: Thought content normal.         Judgment: Judgment normal.         Diagnostic Data  Procedures    Assessment  Diagnoses and all orders for this visit:    1. Depression, unspecified depression type (Primary)  -     buPROPion XL (WELLBUTRIN XL) 300 MG 24 hr tablet; Take 1 tablet by mouth Daily.  Dispense: 10 tablet; Refill: 0    2. Peripheral polyneuropathy  -     Ambulatory Referral to Neurology    3. RLS (restless legs syndrome)  -     Ambulatory Referral to Neurology    4. Encounter for Papanicolaou smear of cervix  -     Ambulatory Referral to Obstetrics / Gynecology    5. Health care maintenance  -     CBC w AUTO Differential; Future  -     Comprehensive Metabolic Panel  -     TSH; Future  -     Hemoglobin A1c; Future  -     Lipid Panel  -     Hepatitis C Antibody    6. Need for diphtheria-tetanus-pertussis (Tdap) vaccine  -     Tdap Vaccine Greater Than or Equal To 6yo IM        Plan    1. Depression, unspecified depression type (Primary)- well-controlled on Wellbutrin 300 mg daily.  Refilled med.  Keep same.  Continue to monitor.  Continue to follow-up with case management and therapy at addiction recovery center.    2. Peripheral polyneuropathy- worse, referred to neurology.    3. RLS (restless legs syndrome)- worse, referred to neurology.    4. Encounter for  Papanicolaou smear of cervix- Ambulatory Referral to Obstetrics / Gynecology    5. Health care maintenance- obtain fasting labs.    6. Need for diphtheria-tetanus-pertussis (Tdap) vaccine- administered Tdap in office, patient tolerated well.      Return in about 2 weeks (around 5/18/2022) for Recheck.    Ac Gabriel PA-C  05/04/2022

## 2022-05-05 ENCOUNTER — TELEPHONE (OUTPATIENT)
Dept: INTERNAL MEDICINE | Facility: CLINIC | Age: 37
End: 2022-05-05

## 2022-05-06 NOTE — TELEPHONE ENCOUNTER
Pts medication still isn't at [facility] pharmacy. Pt would like to be notified whenever the medication is called in, please advise.

## 2022-05-09 ENCOUNTER — TELEPHONE (OUTPATIENT)
Dept: INTERNAL MEDICINE | Facility: CLINIC | Age: 37
End: 2022-05-09

## 2022-05-09 NOTE — TELEPHONE ENCOUNTER
PT CALLED SHE STATED THAT SHE NEEDED HER WELLBUTRIN CALLED INTO THE PHARMACY. SHE HAS BEEN TRYING TO GET IT SINCE 05/06/22

## 2022-05-09 NOTE — TELEPHONE ENCOUNTER
Caller: Diamond Llamas    Relationship: Self    Best call back number: [phone]    Requested Prescriptions:   UNKNOWN- CALL WAS DISCONNECTED BEFORE GETTING ALL DETAILS AND PATIENT DID NOT ANSWER ATTEMPED CALLBACK    Pharmacy where request should be sent:  UNKNOWN- STATES THIS HAS BEEN REQUESTED LAST WEEK    Additional details provided by patient: PATIENT STATES HAS BEEN CALLING TO HAVE MEDICATION REFILLED AND PHARMACY STATES THEY HAVE NOT RECEIVED YET. PLEASE SEND REFILLS- CALL WAS DISCONNECTED AND PATIENT DID NOT ANSWER ATTEMPT TO CALL BACK. UNKNOWN WHICH MEDICATION OR PHARMACY.    Does the patient have less than a 3 day supply:  [x] Yes  [] No    Steve Dewey Rep   05/09/22 12:14 EDT

## 2022-05-10 ENCOUNTER — TELEPHONE (OUTPATIENT)
Dept: INTERNAL MEDICINE | Facility: CLINIC | Age: 37
End: 2022-05-10

## 2022-05-10 NOTE — TELEPHONE ENCOUNTER
FACUNDO, PLEASE MAKE SURE THIS GETS CALLED IN TODAY HER WELLBUTRIN 300MG ONE A DAY. CAIT CHANGED HER SCRIPT AND TOLD HER HE WOULD SEND IT IN. [facility]. [phone].